# Patient Record
Sex: FEMALE | Race: WHITE | Employment: UNEMPLOYED | URBAN - METROPOLITAN AREA
[De-identification: names, ages, dates, MRNs, and addresses within clinical notes are randomized per-mention and may not be internally consistent; named-entity substitution may affect disease eponyms.]

---

## 2022-08-04 LAB
ANTIBODY SCREEN, EXTERNAL: NEGATIVE
CHLAMYDIA, EXTERNAL: NEGATIVE
HBSAG, EXTERNAL: NEGATIVE
HIV, EXTERNAL: NON REACTIVE
N. GONORRHEA, EXTERNAL: NEGATIVE
RPR, EXTERNAL: NON REACTIVE
RUBELLA, EXTERNAL: NORMAL
TYPE, ABO & RH, EXTERNAL: NORMAL

## 2023-01-29 ENCOUNTER — HOSPITAL ENCOUNTER (EMERGENCY)
Age: 23
Discharge: HOME | End: 2023-01-29
Attending: OBSTETRICS & GYNECOLOGY | Admitting: OBSTETRICS & GYNECOLOGY
Payer: MEDICAID

## 2023-01-29 VITALS
DIASTOLIC BLOOD PRESSURE: 60 MMHG | SYSTOLIC BLOOD PRESSURE: 105 MMHG | RESPIRATION RATE: 16 BRPM | OXYGEN SATURATION: 98 % | HEIGHT: 62 IN | HEART RATE: 70 BPM | BODY MASS INDEX: 29.44 KG/M2 | WEIGHT: 160 LBS | TEMPERATURE: 98.3 F

## 2023-01-29 LAB
A1 MICROGLOB PLACENTAL VAG QL: NEGATIVE
CONTROL LINE PRESENT?: NORMAL
EXPIRATION DATE: NORMAL
INTERNAL NEGATIVE CONTROL: NORMAL
KIT LOT NO.: NORMAL

## 2023-01-29 PROCEDURE — 59025 FETAL NON-STRESS TEST: CPT

## 2023-01-29 PROCEDURE — 84112 EVAL AMNIOTIC FLUID PROTEIN: CPT | Performed by: OBSTETRICS & GYNECOLOGY

## 2023-01-29 PROCEDURE — 76815 OB US LIMITED FETUS(S): CPT

## 2023-01-29 PROCEDURE — 75810000275 HC EMERGENCY DEPT VISIT NO LEVEL OF CARE

## 2023-01-29 PROCEDURE — 84112 EVAL AMNIOTIC FLUID PROTEIN: CPT

## 2023-01-29 PROCEDURE — 99284 EMERGENCY DEPT VISIT MOD MDM: CPT

## 2023-01-29 NOTE — ED NOTES
Brief Medical Screening Examination for OB patient at triage    HPI: 20 female 34 weeks pregnant complains of abdominal cramping, back pain    ROS: +abdominal cramping, back pain    Vitals: Patient Vitals for the past 4 hrs:   Temp Pulse Resp BP SpO2   01/29/23 1158 98.3 °F (36.8 °C) 70 16 105/60 98 %   01/29/23 1137 98.2 °F (36.8 °C) 70 16 114/68 99 %         Physical Exam:  General appearance: No apparent distress. Stable vitals. Afebrile. Skin exam: Warm and dry. No pallor. Neurologic: Alert and oriented. Abdominal exam: Soft, nontender. Gravid uterus. Differential diagnosis: Labor, premature labor, threatened miscarriage, Baldemar-Abrams contractions, uterine contractions in pregnancy, premature rupture of membranes. This patient with a primary obstetrical chief complaint is stable and medically cleared for direct transfer to the [facility] Labor & Delivery unit for further monitoring and workup. Medical screening exam is complete.     James Fatima DO

## 2023-01-29 NOTE — PROGRESS NOTES
Patient discharge home with standard labor precautions and instructions to call [facility] for an appointment this week. Patient and  verbalized understanding and ambulated off unit without incidents.

## 2023-01-29 NOTE — PROGRESS NOTES
Patient presents to unit  (prior C/S in [facility]) at 33w3d with complaints of leaking of fluid starting around 0900 and lower abdominal and back pain that started around 0300 this morning. Patient states that she was originally seen at [facility] but could not remember the name of the provider that she was seeing, and is scheduled to begin care with Dr. Katya [facility]) on 23. Denies VB. Good FM felt by patient.     Amnisure performed = NEGATIVE

## 2023-01-29 NOTE — H&P
OB History & Physical    Name: Vandana Barry MRN: [id number]  SSN: [id number]    YOB: 2000  Age: 20 y.o. Sex: female      Subjective:   History obtained with translation service. Reason for Admission:  Pregnancy and possible contractions, leaking fluid    History of Present Illness: Vandana Barry is a 20 y.o.  female with an estimated gestational age of 27w4d with Estimated Date of Delivery: 3/16/23. Patient complains of intermittent lower abdominal pain since early this morning, as well as leaking fluid, saying her underclothes had to be changed twice. No further leaking. She denies bleeding. The baby has been active. The patient is from [place], speaks little english. She reports she was seen at [facility] as well as her local health department, but has scheduled a visit at [facility] on . Previous OB:  CS at term in [place]. Patient states she is planning a repeat CS. All: NKDA    Meds: PNV    Surgical CS as above    Medical: The patient denies CV, resp, GI, renal disease    OB History          2    Para   1    Term   1            AB        Living             SAB        IAB        Ectopic        Molar        Multiple        Live Births                  Social History     Occupational History    Not on file   Tobacco Use    Smoking status: Never     Passive exposure: Never    Smokeless tobacco: Never   Substance and Sexual Activity    Alcohol use: Never    Drug use: Never    Sexual activity: Not on file     SH:  Patient denies tobacco, alcohol, recreational drugs. .     FH: reviewed and negative    Review of systems:    General: Denies fever, sweats, chills  CV: Denies CP, palpitations  Resp: Denies SOB, cough  GI: Denies nausea, vomiting, diarrhea  : Denies dysura, abnormal frequency, hematuria  Neuro: Denies HA, visual disturbance  All other systems reviewed and are negative    No Known Allergies  Prior to Admission medications    Medication Sig Start Date End Date Taking? Authorizing Provider   PNV Comb #2-Iron-FA-Omega 3 29-1-400 mg cmpk Take 1 Tablet by mouth daily. Yes Provider, Historical        Objective:     Vitals:    Vitals:    23 1137 23 1158 23 1206   BP: 114/68 105/60    Pulse: 70 70    Resp: 16 16    Temp: 98.2 °F (36.8 °C) 98.3 °F (36.8 °C)    SpO2: 99% 98%    Weight: 73.8 kg (162 lb 11.2 oz)  72.6 kg (160 lb)   Height:   5' 1.81\" (1.57 m)      Temp (24hrs), Av.3 °F (36.8 °C), Min:98.2 °F (36.8 °C), Max:98.3 °F (36.8 °C)    BP  Min: 105/60  Max: 114/68     Physical Exam  General: in NAD, psychologically stable  Neck: normal ROM  Back: no CVAT  Heart: regular rate and rhythm  Respiratory: unlabored breathing  Abdomen: Gravid, soft, nontender, no abnormal masses, rebound, rigidity, guarding  Fundus: soft, nontender  Extremities: no abnormal cyanosis, clubbing, edema  Skin: warm, dry, no abnormal lesions noted  Neurological: DTR's 1-2+ no clonus  Pelvic:Cervical Exam: long/closed    Uterine Activity: occasional contractions  Membranes: no gross evidence of ROM; amnisure negative  Fetal Heart Rate: adequate variability and reactivity; no significant abnormal decelerations    Labs:   Recent Results (from the past 24 hour(s))   RUPTURE OF FETAL MEMBRANES, POC    Collection Time: 23 12:33 PM   Result Value Ref Range    Rupture of fetal membrane Negative Negative    Control line present? Acceptable     Internal negative control Acceptable     Kit Lot No. [id number]     Expiration date 10/14/2025      Bedside ultrasound:  davis IUP, breech presentation;  JONATHAN 6cm, Placenta fundal left     Fetal FHT and activity noted    The patient's prenatal record is reviewed, including notes and diagnostic results, laboratory findings and ultrasound findings.     Assessment and Plan:     33 week IUP, false labor, no evidence for ROM  Low normal JONATHAN  Fetal surveillance reassuring  Discharge home  Discussed with Dr Triston Moctezuma will call the patient tomorrow to schedule visit this week  Precautions discussed; questions answered    Signed By:  Tyrel Draek MD     January 29, 2023

## 2023-01-29 NOTE — DISCHARGE INSTRUCTIONS
Semanas 32 a 34 de shannon embarazo: Instrucciones de cuidado  Weeks 32 to 34 of Your Pregnancy: Care Instructions  Decida si quiere depositar o donar la luis a del cordón umbilical de shannon bebé. Si desea guardar esta luis a, debe planearlo con anticipación. Decida sobre la circuncisión. Las creencias personales, religiosas o culturales pueden influir en shannon decisión. Usted decide lo que quiere para shannon bebé. Aprenda cómo aliviar las hemorroides. Obtenga más líquidos, frutas, verduras y [place] en shannon Iveth Ponce. Evite sentarse caitie mucho tiempo. Límpiese con papel higiénico húmedo. O pruebe las almohadillas de hamamelis. Pruebe bolsas de hielo o reynaldo de asiento tibios para las molestias. Use crema de hidrocortisona para el dolor o [place]. Pregunte al médico acerca de ablandadores de heces. Considere los beneficios de la lactancia. Reduce el riesgo del síndrome de muerte súbita del lactante. Los bebés amamantados tienen menos probabilidades de contraer ciertas infecciones. Y tienen menos probabilidades de ser obesos o tener diabetes más adelante en la kary. Puede reducir el riesgo de que usted tenga cáncer de seno y de ovario y osteoporosis. Le ahorra dinero. La atención de seguimiento es jody parte clave de shannon tratamiento y seguridad. Asegúrese de hacer y acudir a todas las citas, y llame a shannon médico si está teniendo problemas. También es jody buena idea saber los resultados de ronaldo exámenes y mantener jody lista de los medicamentos que sowmya. ¿Dónde puede encontrar más información en inglés? Vaya a [email or web address].stone.com/  Brendan [id number] en la búsqueda para aprender más acerca de \"Semanas 28 a 29 de shannon embarazo: Instrucciones de cuidado. \"  Revisado: 23 febrero, 2022               Versión del contenido: 13.4  © 6046-1908 Healthwise, [facility].    Las instrucciones de cuidado fueron adaptadas bajo licencia por [employer] (which disclaims liability or warranty for this information). Si usted tiene [place] afección médica o sobre estas instrucciones, siempre pregunte a shannon profesional de armando. [employer] niega toda garantía o responsabilidad por shannon uso de esta información.

## 2023-01-30 NOTE — PROCEDURES
Non-Stress Test    Brief history, indication:  33 week IUP,  contractions    Findings:  Baseline  bpm; moderate variability; greater than two accelerations to 150 bpm; no significant decelerations noted.     Result: Reactive NST    Nonstress test interpreted by myself      Tyler Veliz MD

## 2023-02-13 ENCOUNTER — ANESTHESIA (OUTPATIENT)
Dept: LABOR AND DELIVERY | Age: 23
End: 2023-02-13
Payer: MEDICAID

## 2023-02-13 ENCOUNTER — ANESTHESIA EVENT (OUTPATIENT)
Dept: LABOR AND DELIVERY | Age: 23
End: 2023-02-13
Payer: MEDICAID

## 2023-02-13 ENCOUNTER — HOSPITAL ENCOUNTER (INPATIENT)
Age: 23
LOS: 2 days | Discharge: HOME | End: 2023-02-15
Attending: SPECIALIST | Admitting: SPECIALIST
Payer: MEDICAID

## 2023-02-13 PROBLEM — Z34.90 PREGNANCY: Status: ACTIVE | Noted: 2023-02-13

## 2023-02-13 LAB
ABO + RH BLD: NORMAL
ALBUMIN SERPL-MCNC: 2.6 G/DL (ref 3.5–5)
ALBUMIN/GLOB SERPL: 0.6 (ref 1.1–2.2)
ALP SERPL-CCNC: 246 U/L (ref 45–117)
ALT SERPL-CCNC: 81 U/L (ref 12–78)
AMPHET UR QL SCN: NEGATIVE
ANION GAP SERPL CALC-SCNC: 10 MMOL/L (ref 5–15)
AST SERPL-CCNC: 36 U/L (ref 15–37)
BARBITURATES UR QL SCN: NEGATIVE
BASOPHILS # BLD: 0.1 K/UL (ref 0–0.1)
BASOPHILS NFR BLD: 0 % (ref 0–1)
BENZODIAZ UR QL: NEGATIVE
BILIRUB SERPL-MCNC: 0.5 MG/DL (ref 0.2–1)
BLOOD GROUP ANTIBODIES SERPL: NORMAL
BUN SERPL-MCNC: 8 MG/DL (ref 6–20)
BUN/CREAT SERPL: 19 (ref 12–20)
CALCIUM SERPL-MCNC: 8.7 MG/DL (ref 8.5–10.1)
CANNABINOIDS UR QL SCN: NEGATIVE
CHLORIDE SERPL-SCNC: 107 MMOL/L (ref 97–108)
CO2 SERPL-SCNC: 21 MMOL/L (ref 21–32)
COCAINE UR QL SCN: NEGATIVE
CREAT SERPL-MCNC: 0.42 MG/DL (ref 0.55–1.02)
DIFFERENTIAL METHOD BLD: ABNORMAL
DRUG SCRN COMMENT,DRGCM: NORMAL
EOSINOPHIL # BLD: 0.2 K/UL (ref 0–0.4)
EOSINOPHIL NFR BLD: 1 % (ref 0–7)
ERYTHROCYTE [DISTWIDTH] IN BLOOD BY AUTOMATED COUNT: 13.4 % (ref 11.5–14.5)
GLOBULIN SER CALC-MCNC: 4.6 G/DL (ref 2–4)
GLUCOSE SERPL-MCNC: 86 MG/DL (ref 65–100)
HCT VFR BLD AUTO: 38 % (ref 35–47)
HGB BLD-MCNC: 12.5 G/DL (ref 11.5–16)
IMM GRANULOCYTES # BLD AUTO: 0.1 K/UL (ref 0–0.04)
IMM GRANULOCYTES NFR BLD AUTO: 1 % (ref 0–0.5)
LYMPHOCYTES # BLD: 2.5 K/UL (ref 0.8–3.5)
LYMPHOCYTES NFR BLD: 21 % (ref 12–49)
MCH RBC QN AUTO: 27.1 PG (ref 26–34)
MCHC RBC AUTO-ENTMCNC: 32.9 G/DL (ref 30–36.5)
MCV RBC AUTO: 82.4 FL (ref 80–99)
METHADONE UR QL: NEGATIVE
MONOCYTES # BLD: 0.7 K/UL (ref 0–1)
MONOCYTES NFR BLD: 6 % (ref 5–13)
NEUTS SEG # BLD: 8.5 K/UL (ref 1.8–8)
NEUTS SEG NFR BLD: 71 % (ref 32–75)
NRBC # BLD: 0 K/UL (ref 0–0.01)
NRBC BLD-RTO: 0 PER 100 WBC
OPIATES UR QL: NEGATIVE
PCP UR QL: NEGATIVE
PLATELET # BLD AUTO: 171 K/UL (ref 150–400)
PMV BLD AUTO: 12.6 FL (ref 8.9–12.9)
POTASSIUM SERPL-SCNC: 3.7 MMOL/L (ref 3.5–5.1)
PROT SERPL-MCNC: 7.2 G/DL (ref 6.4–8.2)
RBC # BLD AUTO: 4.61 M/UL (ref 3.8–5.2)
SODIUM SERPL-SCNC: 138 MMOL/L (ref 136–145)
SPECIMEN EXP DATE BLD: NORMAL
WBC # BLD AUTO: 12 K/UL (ref 3.6–11)

## 2023-02-13 PROCEDURE — 36415 COLL VENOUS BLD VENIPUNCTURE: CPT

## 2023-02-13 PROCEDURE — 74011250636 HC RX REV CODE- 250/636: Performed by: SPECIALIST

## 2023-02-13 PROCEDURE — 65410000002 HC RM PRIVATE OB

## 2023-02-13 PROCEDURE — 74011000250 HC RX REV CODE- 250: Performed by: SPECIALIST

## 2023-02-13 PROCEDURE — 85025 COMPLETE CBC W/AUTO DIFF WBC: CPT

## 2023-02-13 PROCEDURE — 74011000250 HC RX REV CODE- 250: Performed by: ANESTHESIOLOGY

## 2023-02-13 PROCEDURE — 76010000391 HC C SECN FIRST 1 HR: Performed by: SPECIALIST

## 2023-02-13 PROCEDURE — 74011250636 HC RX REV CODE- 250/636: Performed by: NURSE ANESTHETIST, CERTIFIED REGISTERED

## 2023-02-13 PROCEDURE — 88307 TISSUE EXAM BY PATHOLOGIST: CPT

## 2023-02-13 PROCEDURE — 77010026064 HC OXYGEN INFANT MED AIR MIN: Performed by: SPECIALIST

## 2023-02-13 PROCEDURE — 77030007866 HC KT SPN ANES BBMI -B: Performed by: ANESTHESIOLOGY

## 2023-02-13 PROCEDURE — 74011250636 HC RX REV CODE- 250/636: Performed by: ANESTHESIOLOGY

## 2023-02-13 PROCEDURE — 76060000078 HC EPIDURAL ANESTHESIA: Performed by: SPECIALIST

## 2023-02-13 PROCEDURE — 75410000003 HC RECOV DEL/VAG/CSECN EA 0.5 HR: Performed by: SPECIALIST

## 2023-02-13 PROCEDURE — 80053 COMPREHEN METABOLIC PANEL: CPT

## 2023-02-13 PROCEDURE — 4A1HXCZ MONITORING OF PRODUCTS OF CONCEPTION, CARDIAC RATE, EXTERNAL APPROACH: ICD-10-PCS | Performed by: SPECIALIST

## 2023-02-13 PROCEDURE — 80307 DRUG TEST PRSMV CHEM ANLYZR: CPT

## 2023-02-13 PROCEDURE — 74011000258 HC RX REV CODE- 258: Performed by: NURSE ANESTHETIST, CERTIFIED REGISTERED

## 2023-02-13 PROCEDURE — 86900 BLOOD TYPING SEROLOGIC ABO: CPT

## 2023-02-13 PROCEDURE — 75410000002 HC LABOR FEE PER 1 HR: Performed by: SPECIALIST

## 2023-02-13 PROCEDURE — 76060000032 HC ANESTHESIA 0.5 TO 1 HR: Performed by: SPECIALIST

## 2023-02-13 RX ORDER — OXYTOCIN/RINGER'S LACTATE 30/500 ML
10 PLASTIC BAG, INJECTION (ML) INTRAVENOUS AS NEEDED
Status: DISCONTINUED | OUTPATIENT
Start: 2023-02-13 | End: 2023-02-15 | Stop reason: HOSPADM

## 2023-02-13 RX ORDER — IBUPROFEN 400 MG/1
800 TABLET ORAL EVERY 8 HOURS
Status: DISCONTINUED | OUTPATIENT
Start: 2023-02-13 | End: 2023-02-15 | Stop reason: HOSPADM

## 2023-02-13 RX ORDER — SODIUM CHLORIDE 0.9 % (FLUSH) 0.9 %
5-40 SYRINGE (ML) INJECTION EVERY 8 HOURS
Status: DISCONTINUED | OUTPATIENT
Start: 2023-02-13 | End: 2023-02-15 | Stop reason: HOSPADM

## 2023-02-13 RX ORDER — ACETAMINOPHEN 325 MG/1
650 TABLET ORAL
Status: DISCONTINUED | OUTPATIENT
Start: 2023-02-13 | End: 2023-02-15 | Stop reason: HOSPADM

## 2023-02-13 RX ORDER — OXYTOCIN/RINGER'S LACTATE 30/500 ML
87.3 PLASTIC BAG, INJECTION (ML) INTRAVENOUS AS NEEDED
Status: DISCONTINUED | OUTPATIENT
Start: 2023-02-13 | End: 2023-02-15 | Stop reason: HOSPADM

## 2023-02-13 RX ORDER — SODIUM CHLORIDE, SODIUM LACTATE, POTASSIUM CHLORIDE, CALCIUM CHLORIDE 600; 310; 30; 20 MG/100ML; MG/100ML; MG/100ML; MG/100ML
INJECTION, SOLUTION INTRAVENOUS
Status: DISCONTINUED | OUTPATIENT
Start: 2023-02-13 | End: 2023-02-13 | Stop reason: HOSPADM

## 2023-02-13 RX ORDER — ONDANSETRON 2 MG/ML
INJECTION INTRAMUSCULAR; INTRAVENOUS AS NEEDED
Status: DISCONTINUED | OUTPATIENT
Start: 2023-02-13 | End: 2023-02-13 | Stop reason: HOSPADM

## 2023-02-13 RX ORDER — NALOXONE HYDROCHLORIDE 0.4 MG/ML
0.4 INJECTION, SOLUTION INTRAMUSCULAR; INTRAVENOUS; SUBCUTANEOUS AS NEEDED
Status: DISCONTINUED | OUTPATIENT
Start: 2023-02-13 | End: 2023-02-15 | Stop reason: HOSPADM

## 2023-02-13 RX ORDER — FENTANYL CITRATE 50 UG/ML
INJECTION, SOLUTION INTRAMUSCULAR; INTRAVENOUS AS NEEDED
Status: DISCONTINUED | OUTPATIENT
Start: 2023-02-13 | End: 2023-02-13 | Stop reason: HOSPADM

## 2023-02-13 RX ORDER — SIMETHICONE 80 MG
80 TABLET,CHEWABLE ORAL AS NEEDED
Status: DISCONTINUED | OUTPATIENT
Start: 2023-02-13 | End: 2023-02-15 | Stop reason: HOSPADM

## 2023-02-13 RX ORDER — DIPHENHYDRAMINE HYDROCHLORIDE 50 MG/ML
12.5 INJECTION, SOLUTION INTRAMUSCULAR; INTRAVENOUS
Status: DISCONTINUED | OUTPATIENT
Start: 2023-02-13 | End: 2023-02-15 | Stop reason: HOSPADM

## 2023-02-13 RX ORDER — SODIUM CHLORIDE, SODIUM LACTATE, POTASSIUM CHLORIDE, CALCIUM CHLORIDE 600; 310; 30; 20 MG/100ML; MG/100ML; MG/100ML; MG/100ML
1000 INJECTION, SOLUTION INTRAVENOUS CONTINUOUS
Status: DISCONTINUED | OUTPATIENT
Start: 2023-02-13 | End: 2023-02-13

## 2023-02-13 RX ORDER — MORPHINE SULFATE 0.5 MG/ML
INJECTION, SOLUTION EPIDURAL; INTRATHECAL; INTRAVENOUS AS NEEDED
Status: DISCONTINUED | OUTPATIENT
Start: 2023-02-13 | End: 2023-02-13 | Stop reason: HOSPADM

## 2023-02-13 RX ORDER — ONDANSETRON 2 MG/ML
4 INJECTION INTRAMUSCULAR; INTRAVENOUS
Status: DISCONTINUED | OUTPATIENT
Start: 2023-02-13 | End: 2023-02-15 | Stop reason: HOSPADM

## 2023-02-13 RX ORDER — SODIUM CHLORIDE 0.9 % (FLUSH) 0.9 %
5-40 SYRINGE (ML) INJECTION AS NEEDED
Status: DISCONTINUED | OUTPATIENT
Start: 2023-02-13 | End: 2023-02-15 | Stop reason: HOSPADM

## 2023-02-13 RX ORDER — KETOROLAC TROMETHAMINE 30 MG/ML
INJECTION, SOLUTION INTRAMUSCULAR; INTRAVENOUS AS NEEDED
Status: DISCONTINUED | OUTPATIENT
Start: 2023-02-13 | End: 2023-02-13 | Stop reason: HOSPADM

## 2023-02-13 RX ORDER — SODIUM CHLORIDE 0.9 % (FLUSH) 0.9 %
5-40 SYRINGE (ML) INJECTION EVERY 8 HOURS
Status: DISCONTINUED | OUTPATIENT
Start: 2023-02-13 | End: 2023-02-13

## 2023-02-13 RX ORDER — OXYTOCIN/RINGER'S LACTATE 30/500 ML
PLASTIC BAG, INJECTION (ML) INTRAVENOUS
Status: DISCONTINUED | OUTPATIENT
Start: 2023-02-13 | End: 2023-02-13 | Stop reason: HOSPADM

## 2023-02-13 RX ORDER — OXYCODONE AND ACETAMINOPHEN 5; 325 MG/1; MG/1
1 TABLET ORAL
Status: DISCONTINUED | OUTPATIENT
Start: 2023-02-13 | End: 2023-02-15 | Stop reason: HOSPADM

## 2023-02-13 RX ORDER — BUPIVACAINE HYDROCHLORIDE 7.5 MG/ML
INJECTION, SOLUTION EPIDURAL; RETROBULBAR AS NEEDED
Status: DISCONTINUED | OUTPATIENT
Start: 2023-02-13 | End: 2023-02-13 | Stop reason: HOSPADM

## 2023-02-13 RX ORDER — SODIUM CHLORIDE, SODIUM LACTATE, POTASSIUM CHLORIDE, CALCIUM CHLORIDE 600; 310; 30; 20 MG/100ML; MG/100ML; MG/100ML; MG/100ML
125 INJECTION, SOLUTION INTRAVENOUS CONTINUOUS
Status: DISCONTINUED | OUTPATIENT
Start: 2023-02-13 | End: 2023-02-15 | Stop reason: HOSPADM

## 2023-02-13 RX ORDER — FACIAL-BODY WIPES
10 EACH TOPICAL DAILY PRN
Status: DISCONTINUED | OUTPATIENT
Start: 2023-02-13 | End: 2023-02-15 | Stop reason: HOSPADM

## 2023-02-13 RX ORDER — OXYTOCIN/RINGER'S LACTATE 30/500 ML
PLASTIC BAG, INJECTION (ML) INTRAVENOUS
Status: DISPENSED
Start: 2023-02-13 | End: 2023-02-14

## 2023-02-13 RX ADMIN — SODIUM CHLORIDE, POTASSIUM CHLORIDE, SODIUM LACTATE AND CALCIUM CHLORIDE: 600; 310; 30; 20 INJECTION, SOLUTION INTRAVENOUS at 12:12

## 2023-02-13 RX ADMIN — SODIUM CHLORIDE, POTASSIUM CHLORIDE, SODIUM LACTATE AND CALCIUM CHLORIDE 1000 ML: 600; 310; 30; 20 INJECTION, SOLUTION INTRAVENOUS at 11:48

## 2023-02-13 RX ADMIN — ONDANSETRON HYDROCHLORIDE 4 MG: 2 INJECTION, SOLUTION INTRAMUSCULAR; INTRAVENOUS at 12:36

## 2023-02-13 RX ADMIN — CEFAZOLIN 2 G: 1 INJECTION, POWDER, FOR SOLUTION INTRAMUSCULAR; INTRAVENOUS at 11:47

## 2023-02-13 RX ADMIN — SODIUM CHLORIDE, POTASSIUM CHLORIDE, SODIUM LACTATE AND CALCIUM CHLORIDE: 600; 310; 30; 20 INJECTION, SOLUTION INTRAVENOUS at 12:55

## 2023-02-13 RX ADMIN — FENTANYL CITRATE 50 MCG: 50 INJECTION, SOLUTION INTRAMUSCULAR; INTRAVENOUS at 12:44

## 2023-02-13 RX ADMIN — SODIUM CHLORIDE, POTASSIUM CHLORIDE, SODIUM LACTATE AND CALCIUM CHLORIDE 125 ML/HR: 600; 310; 30; 20 INJECTION, SOLUTION INTRAVENOUS at 20:18

## 2023-02-13 RX ADMIN — SODIUM CHLORIDE, POTASSIUM CHLORIDE, SODIUM LACTATE AND CALCIUM CHLORIDE 1000 ML: 600; 310; 30; 20 INJECTION, SOLUTION INTRAVENOUS at 10:30

## 2023-02-13 RX ADMIN — SODIUM CHLORIDE, POTASSIUM CHLORIDE, SODIUM LACTATE AND CALCIUM CHLORIDE 125 ML/HR: 600; 310; 30; 20 INJECTION, SOLUTION INTRAVENOUS at 13:12

## 2023-02-13 RX ADMIN — Medication 250 MCG: at 12:15

## 2023-02-13 RX ADMIN — PHENYLEPHRINE HYDROCHLORIDE 40 MCG/MIN: 10 INJECTION INTRAVENOUS at 12:16

## 2023-02-13 RX ADMIN — KETOROLAC TROMETHAMINE 30 MG: 30 INJECTION, SOLUTION INTRAMUSCULAR; INTRAVENOUS at 13:00

## 2023-02-13 RX ADMIN — Medication 900 ML/HR: at 12:35

## 2023-02-13 RX ADMIN — BUPIVACAINE HYDROCHLORIDE 10.5 MG: 7.5 INJECTION, SOLUTION EPIDURAL; RETROBULBAR at 12:15

## 2023-02-13 NOTE — ANESTHESIA POSTPROCEDURE EVALUATION
Procedure(s):   SECTION. spinal    Anesthesia Post Evaluation        Patient location during evaluation: PACU  Note status: Adequate. Level of consciousness: responsive to verbal stimuli and sleepy but conscious  Pain management: satisfactory to patient  Airway patency: patent  Anesthetic complications: no  Cardiovascular status: acceptable  Respiratory status: acceptable  Hydration status: acceptable  Comments: +Post-Anesthesia Evaluation and Assessment    Patient: Satish Rodriguez MRN: [id number]  SSN: [id number]   YOB: 2000  Age: 20 y.o. Sex: female      Cardiovascular Function/Vital Signs    BP (!) 113/57   Pulse 70   Temp 36.4 °C (97.6 °F)   Resp 22   Ht 5' 1\" (1.549 m)   Wt 75.3 kg (166 lb)   SpO2 99%   BMI 31.37 kg/m²     Patient is status post Procedure(s):   SECTION. Nausea/Vomiting: Controlled. Postoperative hydration reviewed and adequate. Pain:  Pain Scale 1: Numeric (0 - 10) (23 1100)  Pain Intensity 1: 0 (23 1100)   Managed. Neurological Status: At baseline. Mental Status and Level of Consciousness: Arousable. Pulmonary Status:   O2 Device: Nasal cannula (23 1314)   Adequate oxygenation and airway patent. Complications related to anesthesia: None    Post-anesthesia assessment completed. No concerns. Signed By: Angela Arora MD    2023  Post anesthesia nausea and vomiting:  controlled      INITIAL Post-op Vital signs:   Vitals Value Taken Time   /57 23 1314   Temp 36.4 °C (97.6 °F) 23 1314   Pulse 70 23 1314   Resp 22 23 1314   SpO2 99 % 23 1318   Vitals shown include unvalidated device data.

## 2023-02-13 NOTE — ANESTHESIA PROCEDURE NOTES
Spinal Block    Start time: 2/13/2023 12:14 PM  End time: 2/13/2023 12:15 PM  Performed by: Stanislaw Fair MD  Authorized by: Stanislaw Fair MD     Pre-procedure:   Indications: at surgeon's request and primary anesthetic  Preanesthetic Checklist: patient identified, risks and benefits discussed, anesthesia consent, site marked, patient being monitored, timeout performed and fire risk safety assessment completed and verbalized    Timeout Time: 12:14 EST      Spinal Block:   Patient Position:  Seated  Prep Region:  Lumbar  Prep: DuraPrep      Location:  L3-4  Technique:  Single shot      Med Admin Time: 2/13/2023 12:15 PM    Needle:   Needle Type:  Pencan  Needle Gauge:  25 G  Attempts:  1      Events: CSF confirmed, no blood with aspiration and no paresthesia        Assessment:  Insertion:  Uncomplicated  Patient tolerance:  Patient tolerated the procedure well with no immediate complications

## 2023-02-13 NOTE — ANESTHESIA PREPROCEDURE EVALUATION
Relevant Problems   No relevant active problems       Anesthetic History   No history of anesthetic complications            Review of Systems / Medical History  Patient summary reviewed and pertinent labs reviewed    Pulmonary  Within defined limits                 Neuro/Psych   Within defined limits           Cardiovascular  Within defined limits                Exercise tolerance: >4 METS     GI/Hepatic/Renal  Within defined limits              Endo/Other  Within defined limits           Other Findings              Physical Exam    Airway  Mallampati: II  TM Distance: > 6 cm  Neck ROM: normal range of motion   Mouth opening: Normal     Cardiovascular  Regular rate and rhythm,  S1 and S2 normal,  no murmur, click, rub, or gallop  Rhythm: regular  Rate: normal         Dental  No notable dental hx       Pulmonary  Breath sounds clear to auscultation               Abdominal  GI exam deferred       Other Findings            Anesthetic Plan    ASA: 2  Anesthesia type: spinal      Post-op pain plan if not by surgeon: intrathecal opiates      Anesthetic plan and risks discussed with: Patient

## 2023-02-13 NOTE — H&P
History & Physical    Name: Ella Narayan MRN: [id number]  SSN: [id number]    YOB: 2000  Age: 20 y.o. Sex: female      Subjective:     Estimated Date of Delivery: 3/5/23  OB History    Para Term  AB Living   2 1 1         SAB IAB Ectopic Molar Multiple Live Births                    # Outcome Date GA Lbr Gonzales/2nd Weight Sex Delivery Anes PTL Lv   2 Current            1 Term 2020     CS-LVertical          Ms. Briones Mate admitted with pregnancy at 37w1d for  section due to previous  section and cholestasis of pregnancy. Prenatal course was normal. Please see prenatal records for details. No past medical history on file. Past Surgical History:   Procedure Laterality Date    HX  SECTION       Social History     Occupational History    Not on file   Tobacco Use    Smoking status: Never     Passive exposure: Never    Smokeless tobacco: Never   Substance and Sexual Activity    Alcohol use: Never    Drug use: Never    Sexual activity: Not on file     No family history on file. No Known Allergies  Prior to Admission medications    Medication Sig Start Date End Date Taking? Authorizing Provider   PNV Comb #2-Iron-FA-Omega 3 29-1-400 mg cmpk Take 1 Tablet by mouth daily. Yes Provider, Historical        Review of Systems: A comprehensive review of systems was negative except for that written in the History of Present Illness.     Objective:     Vitals:  Vitals:    23 1050 23 1116   BP: (!) 99/58    Pulse: 80    Resp: 16    Temp: 98.8 °F (37.1 °C)    SpO2: 98%    Weight:  75.3 kg (166 lb)   Height:  5' 1\" (1.549 m)        Physical Exam:  Deferred  Membranes:  Intact  Fetal Heart Rate: Reactive  Baseline: 130 per minute    Prenatal Labs:   Lab Results   Component Value Date/Time    Rubella, External Immune 2022 12:00 AM    HBsAg, External Negative 2022 12:00 AM    HIV, External Non Reactive 2022 12:00 AM    RPR, External Non Reactive 2022 12:00 AM    Gonorrhea, External Negative 2022 12:00 AM    Chlamydia, External Negative 2022 12:00 AM    ABO,Rh B Positive 2022 12:00 AM         Impression/Plan:     Plan:  Admit for  section. Discussed the risks of surgery including the risks of bleeding, infection, deep vein thrombosis, and surgical injuries to internal organs including but not limited to the bowels, bladder, rectum, and female reproductive organs. The patient understands the risks; any and all questions were answered to the patient's satisfaction.

## 2023-02-13 NOTE — PROGRESS NOTES
1600: Bedside shift change report given to LYNDON Garcia by [address], [address] [facility] turned over at this time. Hourly Rounding performed by RN.   Lokesh Galloway RNC

## 2023-02-13 NOTE — PROGRESS NOTES
Pt arrived from home for scheduled repeat  delivery for cholestasis. She is Tajik speaking. Reports +FM, denies LOF or vaginal bleeding.  37w1d  EFM applied, IV inserted, labs sents, consents discussed and signed using .

## 2023-02-13 NOTE — PROGRESS NOTES
TRANSFER - IN REPORT:    Verbal report received from STACY Trent RN(name) on [address]  being received from L&D (unit) for routine progression of care      Report consisted of patients Situation, Background, Assessment and   Recommendations(SBAR). Information from the following report(s) SBAR, OR Summary, Procedure Summary, Intake/Output, MAR, Recent Results, and Med Rec Status was reviewed with the receiving nurse. Opportunity for questions and clarification was provided. Assessment completed upon patients arrival to unit and care assumed.

## 2023-02-14 LAB
BASOPHILS # BLD: 0 K/UL (ref 0–0.1)
BASOPHILS NFR BLD: 0 % (ref 0–1)
DIFFERENTIAL METHOD BLD: ABNORMAL
EOSINOPHIL # BLD: 0.1 K/UL (ref 0–0.4)
EOSINOPHIL NFR BLD: 1 % (ref 0–7)
ERYTHROCYTE [DISTWIDTH] IN BLOOD BY AUTOMATED COUNT: 13.5 % (ref 11.5–14.5)
HCT VFR BLD AUTO: 34.6 % (ref 35–47)
HGB BLD-MCNC: 11.5 G/DL (ref 11.5–16)
IMM GRANULOCYTES # BLD AUTO: 0.1 K/UL (ref 0–0.04)
IMM GRANULOCYTES NFR BLD AUTO: 1 % (ref 0–0.5)
LYMPHOCYTES # BLD: 2.3 K/UL (ref 0.8–3.5)
LYMPHOCYTES NFR BLD: 24 % (ref 12–49)
MCH RBC QN AUTO: 27.4 PG (ref 26–34)
MCHC RBC AUTO-ENTMCNC: 33.2 G/DL (ref 30–36.5)
MCV RBC AUTO: 82.6 FL (ref 80–99)
MONOCYTES # BLD: 0.6 K/UL (ref 0–1)
MONOCYTES NFR BLD: 6 % (ref 5–13)
NEUTS SEG # BLD: 6.4 K/UL (ref 1.8–8)
NEUTS SEG NFR BLD: 68 % (ref 32–75)
NRBC # BLD: 0 K/UL (ref 0–0.01)
NRBC BLD-RTO: 0 PER 100 WBC
PLATELET # BLD AUTO: 153 K/UL (ref 150–400)
PMV BLD AUTO: 12.7 FL (ref 8.9–12.9)
RBC # BLD AUTO: 4.19 M/UL (ref 3.8–5.2)
WBC # BLD AUTO: 9.6 K/UL (ref 3.6–11)

## 2023-02-14 PROCEDURE — 85025 COMPLETE CBC W/AUTO DIFF WBC: CPT

## 2023-02-14 PROCEDURE — 65410000002 HC RM PRIVATE OB

## 2023-02-14 PROCEDURE — 74011250637 HC RX REV CODE- 250/637: Performed by: SPECIALIST

## 2023-02-14 PROCEDURE — 36415 COLL VENOUS BLD VENIPUNCTURE: CPT

## 2023-02-14 RX ADMIN — Medication 80 MG: at 17:13

## 2023-02-14 RX ADMIN — ACETAMINOPHEN 650 MG: 325 TABLET ORAL at 21:05

## 2023-02-14 RX ADMIN — ACETAMINOPHEN 650 MG: 325 TABLET ORAL at 15:37

## 2023-02-14 RX ADMIN — ACETAMINOPHEN 650 MG: 325 TABLET ORAL at 00:05

## 2023-02-14 RX ADMIN — IBUPROFEN 800 MG: 400 TABLET ORAL at 09:10

## 2023-02-14 RX ADMIN — IBUPROFEN 800 MG: 400 TABLET ORAL at 00:05

## 2023-02-14 RX ADMIN — IBUPROFEN 800 MG: 400 TABLET ORAL at 17:13

## 2023-02-14 RX ADMIN — ACETAMINOPHEN 650 MG: 325 TABLET ORAL at 09:10

## 2023-02-14 NOTE — PROGRESS NOTES
Duramorph Follow-Up Note    1 Day Post-Op sp Procedure(s):   SECTION. BP (!) 99/55 (BP 1 Location: Left upper arm, BP Patient Position: At rest)   Pulse 68   Temp 37 °C (98.6 °F)   Resp 16   Ht 5' 1\" (1.549 m)   Wt 75.3 kg (166 lb)   SpO2 96%   Breastfeeding Unknown   BMI 31.37 kg/m² . Patient is POD-1 S/P intrathecal duramorph. Pain is well controlled  Patient reports no headache, fever, weakness or numbness. Epidural/spinal tap site is clean, dry and intact. No obvious Anesthesia complications noted. Plan:    Pain management as per primary service.

## 2023-02-14 NOTE — ROUTINE PROCESS
Bedside and Verbal shift change report given to LYNDON Curry RN (oncoming nurse) by Mani Thompson RN (offgoing nurse). Report included the following information SBAR, OR Summary, Procedure Summary, Intake/Output, MAR, Recent Results, and Med Rec Status.

## 2023-02-14 NOTE — PROGRESS NOTES
Post-Operative  Day 1    Moriah Daley     Information for the patient's :  Kalyn Stafford [[id number]]   , Low Transverse  Patient doing well without significant complaint. Nausea and vomiting resolved, tolerating liquids, no flatus, calderon in place. Vitals:  Visit Vitals  BP (!) 99/55 (BP 1 Location: Left upper arm, BP Patient Position: At rest)   Pulse 68   Temp 98.6 °F (37 °C)   Resp 16   Ht 5' 1\" (1.549 m)   Wt 75.3 kg (166 lb)   SpO2 96%   Breastfeeding Unknown   BMI 31.37 kg/m²     Temp (24hrs), Av.4 °F (36.9 °C), Min:97.6 °F (36.4 °C), Max:98.9 °F (37.2 °C)         Intake and Output:   Current shift: No intake/output data recorded. Last 3 completed shifts:  1901 -  0700  In: 1000 [I.V.:1000]  Out: 2373 [Urine:2100]        Exam:        Patient without distress. Lungs clear. Abdomen, bowel sounds present, soft, expected tenderness, fundus firm Wound dressing intact     Perineum normal lochia noted               Lower extremities are negative for swelling, cords or tenderness.     Labs:   Lab Results   Component Value Date/Time    WBC 9.6 2023 04:16 AM    WBC 12.0 (H) 2023 10:42 AM    HGB 11.5 2023 04:16 AM    HGB 12.5 2023 10:42 AM    HCT 34.6 (L) 2023 04:16 AM    HCT 38.0 2023 10:42 AM    PLATELET 742  04:16 AM    PLATELET 469  10:42 AM       Recent Results (from the past 24 hour(s))   CBC WITH AUTOMATED DIFF    Collection Time: 23 10:42 AM   Result Value Ref Range    WBC 12.0 (H) 3.6 - 11.0 K/uL    RBC 4.61 3.80 - 5.20 M/uL    HGB 12.5 11.5 - 16.0 g/dL    HCT 38.0 35.0 - 47.0 %    MCV 82.4 80.0 - 99.0 FL    MCH 27.1 26.0 - 34.0 PG    MCHC 32.9 30.0 - 36.5 g/dL    RDW 13.4 11.5 - 14.5 %    PLATELET 357 671 - 917 K/uL    MPV 12.6 8.9 - 12.9 FL    NRBC 0.0 0  WBC    ABSOLUTE NRBC 0.00 0.00 - 0.01 K/uL    NEUTROPHILS 71 32 - 75 %    LYMPHOCYTES 21 12 - 49 %    MONOCYTES 6 5 - 13 % EOSINOPHILS 1 0 - 7 %    BASOPHILS 0 0 - 1 %    IMMATURE GRANULOCYTES 1 (H) 0.0 - 0.5 %    ABS. NEUTROPHILS 8.5 (H) 1.8 - 8.0 K/UL    ABS. LYMPHOCYTES 2.5 0.8 - 3.5 K/UL    ABS. MONOCYTES 0.7 0.0 - 1.0 K/UL    ABS. EOSINOPHILS 0.2 0.0 - 0.4 K/UL    ABS. BASOPHILS 0.1 0.0 - 0.1 K/UL    ABS. IMM. GRANS. 0.1 (H) 0.00 - 0.04 K/UL    DF AUTOMATED     TYPE & SCREEN    Collection Time: 02/13/23 10:42 AM   Result Value Ref Range    Crossmatch Expiration 02/16/2023,2359     ABO/Rh(D) B POSITIVE     Antibody screen NEG    DRUG SCREEN, URINE    Collection Time: 02/13/23 10:42 AM   Result Value Ref Range    AMPHETAMINES Negative NEG      BARBITURATES Negative NEG      BENZODIAZEPINES Negative NEG      COCAINE Negative NEG      METHADONE Negative NEG      OPIATES Negative NEG      PCP(PHENCYCLIDINE) Negative NEG      THC (TH-CANNABINOL) Negative NEG      Drug screen comment (NOTE)    METABOLIC PANEL, COMPREHENSIVE    Collection Time: 02/13/23 10:42 AM   Result Value Ref Range    Sodium 138 136 - 145 mmol/L    Potassium 3.7 3.5 - 5.1 mmol/L    Chloride 107 97 - 108 mmol/L    CO2 21 21 - 32 mmol/L    Anion gap 10 5 - 15 mmol/L    Glucose 86 65 - 100 mg/dL    BUN 8 6 - 20 MG/DL    Creatinine 0.42 (L) 0.55 - 1.02 MG/DL    BUN/Creatinine ratio 19 12 - 20      eGFR >60 >60 ml/min/1.73m2    Calcium 8.7 8.5 - 10.1 MG/DL    Bilirubin, total 0.5 0.2 - 1.0 MG/DL    ALT (SGPT) 81 (H) 12 - 78 U/L    AST (SGOT) 36 15 - 37 U/L    Alk.  phosphatase 246 (H) 45 - 117 U/L    Protein, total 7.2 6.4 - 8.2 g/dL    Albumin 2.6 (L) 3.5 - 5.0 g/dL    Globulin 4.6 (H) 2.0 - 4.0 g/dL    A-G Ratio 0.6 (L) 1.1 - 2.2     CBC WITH AUTOMATED DIFF    Collection Time: 02/14/23  4:16 AM   Result Value Ref Range    WBC 9.6 3.6 - 11.0 K/uL    RBC 4.19 3.80 - 5.20 M/uL    HGB 11.5 11.5 - 16.0 g/dL    HCT 34.6 (L) 35.0 - 47.0 %    MCV 82.6 80.0 - 99.0 FL    MCH 27.4 26.0 - 34.0 PG    MCHC 33.2 30.0 - 36.5 g/dL    RDW 13.5 11.5 - 14.5 %    PLATELET 056 209 - 343 K/uL MPV 12.7 8.9 - 12.9 FL    NRBC 0.0 0  WBC    ABSOLUTE NRBC 0.00 0.00 - 0.01 K/uL    NEUTROPHILS 68 32 - 75 %    LYMPHOCYTES 24 12 - 49 %    MONOCYTES 6 5 - 13 %    EOSINOPHILS 1 0 - 7 %    BASOPHILS 0 0 - 1 %    IMMATURE GRANULOCYTES 1 (H) 0.0 - 0.5 %    ABS. NEUTROPHILS 6.4 1.8 - 8.0 K/UL    ABS. LYMPHOCYTES 2.3 0.8 - 3.5 K/UL    ABS. MONOCYTES 0.6 0.0 - 1.0 K/UL    ABS. EOSINOPHILS 0.1 0.0 - 0.4 K/UL    ABS. BASOPHILS 0.0 0.0 - 0.1 K/UL    ABS. IMM. GRANS. 0.1 (H) 0.00 - 0.04 K/UL    DF AUTOMATED         Assessment: Post-Op day 1, stable      Plan:   1.  Routine post-operative care

## 2023-02-14 NOTE — ROUTINE PROCESS
Bedside and Verbal shift change report given to CLAUS Raza RN (oncoming nurse) by LYNDON Mcneal RN (offgoing nurse). Report included the following information SBAR, OR Summary, Procedure Summary, Intake/Output, MAR, Recent Results, and Med Rec Status.

## 2023-02-14 NOTE — ROUTINE PROCESS
2005 vitals obtained, declines attempt OOB at this time, will monitor    2230 OOB to BR, dial bath given, pericare taught and performed by patient, calderon care completed with green CHG wipes, linens changed    2310 to wheelchair to see infant in NICU    2345 returned to bed with minimal assist    0000 vitals obtained    0005 patient medicated with ibuprofen 800mg to start scheduled dose, also given tylenol 650mg

## 2023-02-15 VITALS
RESPIRATION RATE: 16 BRPM | HEART RATE: 69 BPM | HEIGHT: 61 IN | OXYGEN SATURATION: 97 % | BODY MASS INDEX: 31.34 KG/M2 | DIASTOLIC BLOOD PRESSURE: 59 MMHG | WEIGHT: 166 LBS | SYSTOLIC BLOOD PRESSURE: 106 MMHG | TEMPERATURE: 98.2 F

## 2023-02-15 PROCEDURE — 74011250637 HC RX REV CODE- 250/637: Performed by: SPECIALIST

## 2023-02-15 RX ADMIN — ACETAMINOPHEN 650 MG: 325 TABLET ORAL at 05:39

## 2023-02-15 RX ADMIN — IBUPROFEN 800 MG: 400 TABLET ORAL at 09:50

## 2023-02-15 RX ADMIN — IBUPROFEN 800 MG: 400 TABLET ORAL at 02:08

## 2023-02-15 NOTE — DISCHARGE INSTRUCTIONS
El período posparto: Instrucciones de cuidado-Instrucciones de cuidado  Postpartum: Care Instructions  Instrucciones de cuidado  Después del parto (período posparto), shannon cuerpo pasa por muchos cambios. Algunos de estos cambios suceden caitie varias semanas. [address], el cuerpo comienza a recuperarse y se prepara para el amamantamiento. Es posible que [address] se sienta sensible. Las hormonas pueden cambiar shannon estado de ánimo sin advertencia y sin motivo aparente. Caitie las primeras [address] después del parto, muchas mujeres tienen emociones que [place] de nunn a lina. Es posible que le resulte difícil dormir. Es posible que llore mucho. Rolla se llama \"melancolía de la maternidad\". Estas emociones abrumadoras suelen desaparecer dentro de unos días o semanas. Jason es importante hablar con shannon médico acerca de ronaldo sentimientos. Caitie las primeras semanas después del [place], es fácil cansarse demasiado o sentirse [place]. No alex demasiados esfuerzos. Descanse cada vez que pueda, acepte la ayuda de los demás, coma alecia y funmilayo abundantes líquidos. En el primer par de [place] de louise a chance, es posible que shannon médico o partera deseen verla y hacer un plan para cualquier atención de seguimiento que usted pueda necesitar. Probablemente tendrá Yoli Barraza prabhu completa de posparto dentro de los primeros 3 meses después del [place]. En daysi momento, shannon médico o partera revisarán shannon recuperación del parto. Él o arian también verán cómo está enfrentando usted ronaldo emociones y conversarán sobre ronaldo inquietudes y preguntas. La atención de seguimiento es jody parte clave de shannon tratamiento y seguridad. Asegúrese de hacer y acudir a todas las citas, y llame a shannon médico si está teniendo problemas. También es jody buena idea saber los resultados de ronaldo exámenes y mantener jody lista de los medicamentos que sowmya. ¿Cómo puede cuidarse en el hogar?   Duerma o descanse cuando shannon bebé lo alex.  Si es posible, permita que mireya familiares o mireya amigos la ayuden con las tareas del hogar. No intente hacerlo todo usted tre. Si tiene hemorroides o hinchazón o dolor alrededor de la abertura de la vagina, pruebe aplicarse frío y calor. Puede aplicarse hielo o jody compresa fría en la deborah caitie 10 a 20 minutos cada vez. Póngase un paño serrano entre el hielo y la piel. Además, trate de sentarse en algunos centímetros de agua tibia (baño de asiento) 3 veces al día y después de las evacuaciones. [place] los analgésicos (medicamentos para el dolor) exactamente según las indicaciones. Si el médico le recetó un analgésico, tómelo según las indicaciones. Si no está tomando un analgésico recetado, pregúntele a shannon médico si puede chitra maynor de [employer]. Coma más fibra para evitar el estreñimiento. Incluya alimentos dale panes y cereales integrales, verduras crudas, frutas crudas y secas, y frijoles (habichuelas). Jessica mucho líquido. Si tiene jody enfermedad renal, cardíaca o hepática y tiene que restringir los líquidos, hable con shannon médico antes de aumentar la cantidad de líquido que roberto. No se lave el interior de la vagina con líquidos (lavados vaginales). Si tiene puntos de sutura, mantenga la deborah limpia con agua tibia, ya sea echándola o rociándola sobre la deborah externa de la vagina y el ano después de usar el baño. Lleve jody lista de preguntas para hacerle a shannon médico o partera. Mireya preguntas podrían ser acerca de lo siguiente:  Cambios en los senos, dale bultos o sensibilidad. Cuándo esperar que regrese shannon período menstrual.  Qué forma de anticoncepción es la más adecuada para usted. El peso que aumentó caitie el [facility]. Las opciones de ANIBAL [place] [address]. Kristin Flank y bebidas son mejores para usted, sobre todo si está amamantando. Problemas que podría tener con la lactancia. Cuándo puede [employer]. Algunas mujeres april vez quieran hablar sobre lubricantes vaginales.   Cualquier sentimiento de tristeza o inquietud que tenga. ¿Cuándo debe pedir ayuda? Llame al 911  en cualquier momento que considere que necesita atención de [place]. Por ejemplo, llame si:    Tiene pensamientos de lastimarse o de hacerle daño a shannon bebé o a otra persona. Se desmayó (perdió el conocimiento). Tiene dolor en el pecho, le falta el aire o tose [place]. Tiene convulsiones. Llame a shannon médico ahora mismo o busque atención médica de inmediato si:    Shannon sangrado vaginal parece hacerse más abundante. Se siente mareada o aturdida, o que está a punto de [place]. Tiene fiebre. Tiene dolor abdominal nuevo o más intenso. Tiene síntomas de un coágulo de luis a en la pierna (que se llama trombosis venosa profunda), dale:  Dolor en la pantorrilla, el muslo, la bridger o detrás de la rodilla. Enrojecimiento e hinchazón en la pierna o la bridger. Tiene señales de preeclampsia, dale:  Hinchazón repentina de la livier, las larisa o los pies. Nuevos problemas de la vista (dale oscurecimiento, daria borroso o daria puntos). Dolor de misty intenso. Preste especial atención a los cambios en shannon armando y asegúrese de comunicarse con shannon médico si:    Tiene nuevo flujo vaginal o juan empeora. Se siente lina o deprimida. Tiene problemas con los senos o el amamantamiento. ¿Dónde puede encontrar más información en inglés? Vaya a http://www.gray.com/  Brendan [id number] en la búsqueda para aprender más acerca de \"El período posparto: Instrucciones de cuidado-Instrucciones de cuidado. \"  Revisado: 23 febrero, 2022               Versión del contenido: 13.4  © 2403-6017 [employer]. Las instrucciones de cuidado fueron adaptadas bajo licencia por [employer] (which disclaims liability or warranty for this information). Si usted tiene Keavy Irvine afección médica o sobre estas instrucciones, siempre pregunte a shannon profesional de armando.  [facility] niega toda garantía o responsabilidad por shannon uso de esta información.

## 2023-02-15 NOTE — PROGRESS NOTES
2515: Oncoming SBAR report received from REMINGOTN Chi RN. Pt. Care assumed at this time. 1300: SBAR report given to BIJU Medina RN. Pt. Care turned over at this time.

## 2023-02-15 NOTE — ROUTINE PROCESS
@1300 SBAR report from MITALI Horton RN. Care of pt assumed at this time. Patient given verbal and written copy of discharge instructions. Signature page signed by patient and responsible party then placed in medical records. Patient given opportunity to ask questions and verbalized understanding of instructions. All questions answered. No distress noted. Pt to follow up in 1 week with [facility] [place].

## 2023-02-15 NOTE — ROUTINE PROCESS
Bedside and Verbal shift change report given to REMINGTON Blanchard RN (oncoming nurse) by LYNDON Cassidy RN (offgoing nurse). Report included the following information SBAR, Procedure Summary, Intake/Output, MAR, Recent Results, and Med Rec Status.

## 2023-02-27 NOTE — OP NOTES
[address]  OPERATIVE REPORT    Name:  Jamila Montiel  MR#:  [id number]  :  2000  ACCOUNT #:  [de-identified]  DATE OF SERVICE:  2023    This is a 38-week pregnancy with a diagnosis of cholestasis of pregnancy with a previous  section, for repeat  section. PREOPERATIVE DIAGNOSIS:  Term pregnancy with previous  section and cholestasis of pregnancy for repeat  section. POSTOPERATIVE DIAGNOSIS: term previous c section    PROCEDURE PERFORMED:  Repeat c section    SURGEON:  Ravinder Pierce MD    ASSISTANT:  Dr. Terry Avelar and PA student Grow. ANESTHESIA: spinal    COMPLICATIONS:  None. SPECIMENS REMOVED:  Pathology none. IMPLANTS:  No implants. ESTIMATED BLOOD LOSS:  500 mL. IV FLUIDS:  1000 mL. URINE OUTPUT:  100 mL. PROCEDURE WAS AS FOLLOWS:  After ensuring informed consent, I expressed to the patient the risks, benefits, and alternatives. The patient was taken to the OR, where she was given a spinal anesthesia and she was prepped and draped in the usual sterile fashion. A Pfannenstiel skin incision was made and carried down to the level of the fascia. The fascia was nicked up in the middle and extended bilaterally with Rivers scissors. The vesicouterine peritoneum was entered sharply and extended bilaterally with the Metzenbaum. The uterus was entered with the scalpel and extended bilaterally with a Metzenbaum. The baby's head was delivered atraumatically. Oropharynx was suctioned. Cord was clamped and cut. The placenta was manually removed. The uterus was exteriorized and cleared off clots and debris. A 0 Vicryl in a running locking fashion was placed in the uterus. Complete hemostasis was seen. A second 0 Vicryl was used to imbricate the first one. Hemostasis was seen. The uterus was returned to the abdomen. The gutters were cleaned.   The fascia was closed with 1 Vicryl and the skin was closed with subcuticular staples. A pressure dressing was placed over the subcutaneous juliet. Sponge counts and instruments were correct x2. The patient tolerated the procedure well, and she was transferred to the recovery room in stable condition.         MD BAN [id number]  D:  02/27/2023 11:20  T:  02/27/2023 15:30  JOB #:  [id number]

## 2023-05-31 ENCOUNTER — OFFICE VISIT (OUTPATIENT)
Age: 23
End: 2023-05-31

## 2023-05-31 VITALS
HEART RATE: 71 BPM | DIASTOLIC BLOOD PRESSURE: 54 MMHG | BODY MASS INDEX: 27.59 KG/M2 | WEIGHT: 146 LBS | RESPIRATION RATE: 16 BRPM | SYSTOLIC BLOOD PRESSURE: 99 MMHG | OXYGEN SATURATION: 99 % | TEMPERATURE: 98.3 F

## 2023-05-31 DIAGNOSIS — T78.40XA RASH DUE TO ALLERGY: Primary | ICD-10-CM

## 2023-05-31 DIAGNOSIS — R21 RASH DUE TO ALLERGY: Primary | ICD-10-CM

## 2023-05-31 RX ORDER — CETIRIZINE HYDROCHLORIDE 10 MG/1
10 TABLET ORAL DAILY
Status: DISCONTINUED | OUTPATIENT
Start: 2023-05-31 | End: 2023-05-31

## 2023-05-31 RX ORDER — DIPHENHYDRAMINE HYDROCHLORIDE 50 MG/ML
25 INJECTION INTRAMUSCULAR; INTRAVENOUS ONCE
Status: COMPLETED | OUTPATIENT
Start: 2023-05-31 | End: 2023-05-31

## 2023-05-31 RX ORDER — HYDROXYZINE HYDROCHLORIDE 25 MG/1
25 TABLET, FILM COATED ORAL NIGHTLY
Qty: 30 TABLET | Refills: 0 | Status: SHIPPED | OUTPATIENT
Start: 2023-05-31 | End: 2023-06-30

## 2023-05-31 RX ORDER — CETIRIZINE HYDROCHLORIDE 10 MG/1
10 TABLET ORAL DAILY
Qty: 30 TABLET | Refills: 0 | Status: SHIPPED | OUTPATIENT
Start: 2023-05-31 | End: 2023-06-30

## 2023-05-31 RX ADMIN — DIPHENHYDRAMINE HYDROCHLORIDE 25 MG: 50 INJECTION INTRAMUSCULAR; INTRAVENOUS at 16:56

## 2023-05-31 ASSESSMENT — ENCOUNTER SYMPTOMS
TROUBLE SWALLOWING: 0
FACIAL SWELLING: 1
WHEEZING: 0
ABDOMINAL PAIN: 0
COUGH: 0
ALLERGIC REACTION: 1
STRIDOR: 0
DIARRHEA: 0
EYES NEGATIVE: 1
DIFFICULTY BREATHING: 0
VOMITING: 0

## 2023-05-31 NOTE — PROGRESS NOTES
Subjective: (As above and below)     The patient/guardian gave verbal consent to treat. Chief Complaint   Patient presents with    Allergic Reaction     Patient c/o rash over entire body started this morning. New Patient       Tati Trevino is a 20 y.o. female who presents for evaluation of :    History provided by:  Patient   used: No    Allergic Reaction  This is a recurrent problem. The current episode started today. The problem occurs constantly. The problem has been gradually worsening since onset. The problem is moderate. It is unknown what she was exposed to. The time of exposure is unknown. The exposure occurred at Home. Associated symptoms include a rash. Pertinent negatives include no abdominal pain, chest pain, chest pressure, coughing, diarrhea, difficulty breathing, stridor, trouble swallowing, vomiting or wheezing. (Rash that has worsen pelon lower ext trunk , chest , back pelon upper ext ) Swelling is present on the face and lips. Past treatments include nothing. The treatment provided no relief. Her past medical history is significant for seasonal allergies. Review of Systems   Constitutional: Negative. HENT:  Positive for facial swelling. Negative for trouble swallowing. Eyes: Negative. Respiratory:  Negative for cough, wheezing and stridor. Cardiovascular:  Negative for chest pain. Gastrointestinal:  Negative for abdominal pain, diarrhea and vomiting. Endocrine: Negative. Genitourinary: Negative. Skin:  Positive for rash. Allergic/Immunologic: Positive for environmental allergies. Neurological: Negative. Review of Systems - negative except as listed above    Reviewed PmHx, RxHx, FmHx, SocHx, AllgHx and updated in chart. History reviewed. No pertinent family history. History reviewed. No pertinent past medical history.    Social History     Socioeconomic History    Marital status:      Spouse name: None    Number of

## 2025-05-28 NOTE — DISCHARGE SUMMARY
Obstetrical Discharge Summary     Name: Ella Narayan MRN: [id number]  SSN: [id number]    YOB: 2000  Age: 20 y.o. Sex: female      Allergies: Patient has no known allergies. Admit Date: 2023    Discharge Date: 2/15/2023     Admitting Physician: Pricilla Weinberg MD     Attending Physician:  Alan Navarro MD     * Admission Diagnoses: Term pregnancy, repeat [Z34.90]  Pregnancy [Z34.90]    * Discharge Diagnoses:   Information for the patient's :  Dipti Araya [[id number]]   Delivery of a 3.105 kg male infant via , Low Transverse on 2023 at 12:35 PM  by Pricilla Weinberg. Apgars were 8  and 9 . Additional Diagnoses:   Hospital Problems as of 2/15/2023 Never Reviewed            Codes Class Noted - Resolved POA    Pregnancy ICD-10-CM: Z34.90  ICD-9-CM: V22.2  2023 - Present Unknown          Lab Results   Component Value Date/Time    ABO/Rh(D) B POSITIVE 2023 10:42 AM    Rubella, External Immune 2022 12:00 AM    ABO,Rh B Positive 2022 12:00 AM    There is no immunization history for the selected administration types on file for this patient. * Procedures: C/S  Procedure(s):   SECTION           * Discharge Condition: good    [facility] Course: Normal hospital course following the delivery. * Disposition: Home    Discharge Medications:   Current Discharge Medication List        CONTINUE these medications which have NOT CHANGED    Details   PNV Comb #2-Iron-FA-Omega 3 29-1-400 mg cmpk Take 1 Tablet by mouth daily. * Follow-up Care/Patient Instructions:   Activity: No lifting, Driving, or Strenuous exercise for 6 weeks and No sex for 6 weeks  Diet: Regular Diet  Wound Care: Keep wound clean and dry    Follow-up Information       Follow up With Specialties Details Why Contact Info    None    None ([phone]) Patient stated that they have no PCP           Discussed:  Pain management, bleeding parameter, PPD, follow up and incision care Patient/Caregiver provided printed discharge information.

## 2025-08-19 ENCOUNTER — HOSPITAL ENCOUNTER (EMERGENCY)
Facility: HOSPITAL | Age: 25
Discharge: HOME | End: 2025-08-19
Attending: EMERGENCY MEDICINE
Payer: MEDICAID

## 2025-08-19 ENCOUNTER — APPOINTMENT (OUTPATIENT)
Facility: HOSPITAL | Age: 25
End: 2025-08-19
Payer: MEDICAID

## 2025-08-19 VITALS
HEART RATE: 77 BPM | TEMPERATURE: 98.1 F | SYSTOLIC BLOOD PRESSURE: 96 MMHG | BODY MASS INDEX: 28.35 KG/M2 | DIASTOLIC BLOOD PRESSURE: 64 MMHG | HEIGHT: 61 IN | WEIGHT: 150.13 LBS | OXYGEN SATURATION: 100 % | RESPIRATION RATE: 16 BRPM

## 2025-08-19 DIAGNOSIS — R11.2 NAUSEA AND VOMITING, UNSPECIFIED VOMITING TYPE: ICD-10-CM

## 2025-08-19 DIAGNOSIS — R52 BODY ACHES: ICD-10-CM

## 2025-08-19 DIAGNOSIS — R51.9 ACUTE NONINTRACTABLE HEADACHE, UNSPECIFIED HEADACHE TYPE: Primary | ICD-10-CM

## 2025-08-19 DIAGNOSIS — J02.9 SORE THROAT: ICD-10-CM

## 2025-08-19 LAB
ALBUMIN SERPL-MCNC: 3.2 G/DL (ref 3.5–5)
ALBUMIN/GLOB SERPL: 0.8 (ref 1.1–2.2)
ALP SERPL-CCNC: 72 U/L (ref 45–117)
ALT SERPL-CCNC: 32 U/L (ref 12–78)
ANION GAP SERPL CALC-SCNC: 9 MMOL/L (ref 2–12)
APPEARANCE UR: CLEAR
AST SERPL-CCNC: 19 U/L (ref 15–37)
BACTERIA URNS QL MICRO: NEGATIVE /HPF
BASOPHILS # BLD: 0.03 K/UL (ref 0–0.1)
BASOPHILS NFR BLD: 0.3 % (ref 0–1)
BILIRUB SERPL-MCNC: 0.2 MG/DL (ref 0.2–1)
BILIRUB UR QL: NEGATIVE
BUN SERPL-MCNC: 5 MG/DL (ref 6–20)
BUN/CREAT SERPL: 13 (ref 12–20)
CALCIUM SERPL-MCNC: 8.6 MG/DL (ref 8.5–10.1)
CHLORIDE SERPL-SCNC: 101 MMOL/L (ref 97–108)
CO2 SERPL-SCNC: 24 MMOL/L (ref 21–32)
COLOR UR: ABNORMAL
CREAT SERPL-MCNC: 0.38 MG/DL (ref 0.55–1.02)
DIFFERENTIAL METHOD BLD: ABNORMAL
EOSINOPHIL # BLD: 0.15 K/UL (ref 0–0.4)
EOSINOPHIL NFR BLD: 1.4 % (ref 0–7)
EPITH CASTS URNS QL MICRO: ABNORMAL /LPF
ERYTHROCYTE [DISTWIDTH] IN BLOOD BY AUTOMATED COUNT: 14.4 % (ref 11.5–14.5)
FLUAV RNA SPEC QL NAA+PROBE: NOT DETECTED
FLUBV RNA SPEC QL NAA+PROBE: NOT DETECTED
GLOBULIN SER CALC-MCNC: 4.2 G/DL (ref 2–4)
GLUCOSE SERPL-MCNC: 73 MG/DL (ref 65–100)
GLUCOSE UR STRIP.AUTO-MCNC: NEGATIVE MG/DL
HCT VFR BLD AUTO: 33.6 % (ref 35–47)
HGB BLD-MCNC: 11.5 G/DL (ref 11.5–16)
HGB UR QL STRIP: NEGATIVE
IMM GRANULOCYTES # BLD AUTO: 0.05 K/UL (ref 0–0.04)
IMM GRANULOCYTES NFR BLD AUTO: 0.5 % (ref 0–0.5)
KETONES UR QL STRIP.AUTO: NEGATIVE MG/DL
LEUKOCYTE ESTERASE UR QL STRIP.AUTO: ABNORMAL
LIPASE SERPL-CCNC: 24 U/L (ref 13–75)
LYMPHOCYTES # BLD: 2.7 K/UL (ref 0.8–3.5)
LYMPHOCYTES NFR BLD: 26 % (ref 12–49)
MCH RBC QN AUTO: 27.4 PG (ref 26–34)
MCHC RBC AUTO-ENTMCNC: 34.2 G/DL (ref 30–36.5)
MCV RBC AUTO: 80 FL (ref 80–99)
MONOCYTES # BLD: 0.75 K/UL (ref 0–1)
MONOCYTES NFR BLD: 7.2 % (ref 5–13)
NEUTS SEG # BLD: 6.69 K/UL (ref 1.8–8)
NEUTS SEG NFR BLD: 64.6 % (ref 32–75)
NITRITE UR QL STRIP.AUTO: NEGATIVE
NRBC # BLD: 0 K/UL (ref 0–0.01)
NRBC BLD-RTO: 0 PER 100 WBC
PH UR STRIP: 6 (ref 5–8)
PLATELET # BLD AUTO: 188 K/UL (ref 150–400)
PMV BLD AUTO: 10.8 FL (ref 8.9–12.9)
POTASSIUM SERPL-SCNC: 3.6 MMOL/L (ref 3.5–5.1)
PROT SERPL-MCNC: 7.4 G/DL (ref 6.4–8.2)
PROT UR STRIP-MCNC: NEGATIVE MG/DL
RBC # BLD AUTO: 4.2 M/UL (ref 3.8–5.2)
RBC #/AREA URNS HPF: ABNORMAL /HPF (ref 0–5)
SARS-COV-2 RNA RESP QL NAA+PROBE: NOT DETECTED
SODIUM SERPL-SCNC: 134 MMOL/L (ref 136–145)
SOURCE: NORMAL
SP GR UR REFRACTOMETRY: 1.02 (ref 1–1.03)
SPECIMEN HOLD: NORMAL
UROBILINOGEN UR QL STRIP.AUTO: 0.2 EU/DL (ref 0.2–1)
WBC # BLD AUTO: 10.4 K/UL (ref 3.6–11)
WBC URNS QL MICRO: ABNORMAL /HPF (ref 0–4)

## 2025-08-19 PROCEDURE — 2580000003 HC RX 258

## 2025-08-19 PROCEDURE — 83690 ASSAY OF LIPASE: CPT

## 2025-08-19 PROCEDURE — 76815 OB US LIMITED FETUS(S): CPT

## 2025-08-19 PROCEDURE — 80053 COMPREHEN METABOLIC PANEL: CPT

## 2025-08-19 PROCEDURE — 85025 COMPLETE CBC W/AUTO DIFF WBC: CPT

## 2025-08-19 PROCEDURE — 87636 SARSCOV2 & INF A&B AMP PRB: CPT

## 2025-08-19 PROCEDURE — 99284 EMERGENCY DEPT VISIT MOD MDM: CPT

## 2025-08-19 PROCEDURE — 6370000000 HC RX 637 (ALT 250 FOR IP)

## 2025-08-19 PROCEDURE — 81001 URINALYSIS AUTO W/SCOPE: CPT

## 2025-08-19 RX ORDER — ONDANSETRON 4 MG/1
4 TABLET, FILM COATED ORAL EVERY 8 HOURS PRN
Qty: 21 TABLET | Refills: 0 | Status: SHIPPED | OUTPATIENT
Start: 2025-08-19 | End: 2025-08-26

## 2025-08-19 RX ORDER — 0.9 % SODIUM CHLORIDE 0.9 %
1000 INTRAVENOUS SOLUTION INTRAVENOUS ONCE
Status: COMPLETED | OUTPATIENT
Start: 2025-08-19 | End: 2025-08-19

## 2025-08-19 RX ORDER — ACETAMINOPHEN 500 MG
500 TABLET ORAL EVERY 6 HOURS PRN
Qty: 120 TABLET | Refills: 0 | Status: SHIPPED | OUTPATIENT
Start: 2025-08-19

## 2025-08-19 RX ORDER — ACETAMINOPHEN 325 MG/1
650 TABLET ORAL
Status: COMPLETED | OUTPATIENT
Start: 2025-08-19 | End: 2025-08-19

## 2025-08-19 RX ADMIN — SODIUM CHLORIDE 1000 ML: 0.9 INJECTION, SOLUTION INTRAVENOUS at 13:02

## 2025-08-19 RX ADMIN — ACETAMINOPHEN 650 MG: 325 TABLET ORAL at 13:01

## 2025-08-19 ASSESSMENT — LIFESTYLE VARIABLES
HOW OFTEN DO YOU HAVE A DRINK CONTAINING ALCOHOL: NEVER
HOW MANY STANDARD DRINKS CONTAINING ALCOHOL DO YOU HAVE ON A TYPICAL DAY: PATIENT DOES NOT DRINK

## 2025-08-19 ASSESSMENT — PAIN - FUNCTIONAL ASSESSMENT: PAIN_FUNCTIONAL_ASSESSMENT: 0-10

## 2025-08-19 ASSESSMENT — PAIN DESCRIPTION - LOCATION: LOCATION: HEAD

## 2025-08-19 ASSESSMENT — PAIN DESCRIPTION - DESCRIPTORS: DESCRIPTORS: ACHING

## 2025-08-19 ASSESSMENT — PAIN SCALES - GENERAL
PAINLEVEL_OUTOF10: 5
PAINLEVEL_OUTOF10: 0
PAINLEVEL_OUTOF10: 0

## 2025-08-19 ASSESSMENT — PAIN DESCRIPTION - ORIENTATION: ORIENTATION: RIGHT;LEFT

## (undated) DEVICE — SOL IRR SOD CL 0.9% 1000ML BTL --

## (undated) DEVICE — PENCIL ES L3M BTTN SWCH S STL HEX LOK BLDE ELECTRD HOLSTER

## (undated) DEVICE — TIP CLEANER: Brand: VALLEYLAB

## (undated) DEVICE — STRAP,POSITIONING,KNEE/BODY,FOAM,4X60": Brand: MEDLINE

## (undated) DEVICE — Device

## (undated) DEVICE — SOLIDIFIER BTL 3000CC --

## (undated) DEVICE — LARGE, DISPOSABLE ALEXIS O C-SECTION PROTECTOR - RETRACTOR: Brand: ALEXIS ® O C-SECTION PROTECTOR - RETRACTOR

## (undated) DEVICE — APPLICATOR MEDICATED 26 CC SOLUTION HI LT ORNG CHLORAPREP

## (undated) DEVICE — STERILE POLYISOPRENE POWDER-FREE SURGICAL GLOVES: Brand: PROTEXIS

## (undated) DEVICE — REM POLYHESIVE ADULT PATIENT RETURN ELECTRODE: Brand: VALLEYLAB

## (undated) DEVICE — C-SECTION II-LF: Brand: MEDLINE INDUSTRIES, INC.

## (undated) DEVICE — MEDI-VAC NON-CONDUCTIVE SUCTION TUBING: Brand: CARDINAL HEALTH

## (undated) DEVICE — SUT VCRL 0 36IN CT VIO --

## (undated) DEVICE — 3000CC GUARDIAN II: Brand: GUARDIAN

## (undated) DEVICE — SPONGE COUNTING BAG: Brand: DEVON

## (undated) DEVICE — STAPLER SKN INSORB 30 COUNT --

## (undated) DEVICE — STAPLER SKIN ABSORBABLE 30 STRL INSORB